# Patient Record
Sex: MALE | Race: WHITE | NOT HISPANIC OR LATINO | Employment: FULL TIME | URBAN - METROPOLITAN AREA
[De-identification: names, ages, dates, MRNs, and addresses within clinical notes are randomized per-mention and may not be internally consistent; named-entity substitution may affect disease eponyms.]

---

## 2019-04-05 DIAGNOSIS — M25.562 LEFT KNEE PAIN, UNSPECIFIED CHRONICITY: Primary | ICD-10-CM

## 2019-04-08 ENCOUNTER — OFFICE VISIT (OUTPATIENT)
Dept: ORTHOPEDICS | Facility: CLINIC | Age: 49
End: 2019-04-08

## 2019-04-08 ENCOUNTER — HOSPITAL ENCOUNTER (OUTPATIENT)
Dept: RADIOLOGY | Facility: HOSPITAL | Age: 49
Discharge: HOME | End: 2019-04-08
Attending: ORTHOPAEDIC SURGERY

## 2019-04-08 VITALS — HEART RATE: 87 BPM | DIASTOLIC BLOOD PRESSURE: 82 MMHG | SYSTOLIC BLOOD PRESSURE: 138 MMHG

## 2019-04-08 DIAGNOSIS — M71.22 BAKER CYST, LEFT: ICD-10-CM

## 2019-04-08 DIAGNOSIS — M70.52 PES ANSERINUS BURSITIS OF LEFT KNEE: ICD-10-CM

## 2019-04-08 DIAGNOSIS — S80.02XA CONTUSION OF LEFT KNEE, INITIAL ENCOUNTER: ICD-10-CM

## 2019-04-08 DIAGNOSIS — S83.242A ACUTE TEAR MEDIAL MENISCUS, LEFT, INITIAL ENCOUNTER: Primary | ICD-10-CM

## 2019-04-08 DIAGNOSIS — M25.562 LEFT KNEE PAIN, UNSPECIFIED CHRONICITY: ICD-10-CM

## 2019-04-08 PROCEDURE — 99204 OFFICE O/P NEW MOD 45 MIN: CPT | Mod: 25,S$PBB,, | Performed by: ORTHOPAEDIC SURGERY

## 2019-04-08 PROCEDURE — 20610 DRAIN/INJ JOINT/BURSA W/O US: CPT | Mod: PBBFAC | Performed by: ORTHOPAEDIC SURGERY

## 2019-04-08 PROCEDURE — 20610 LARGE JOINT ASPIRATION/INJECTION: L KNEE: ICD-10-PCS | Mod: S$PBB,59,51,LT | Performed by: ORTHOPAEDIC SURGERY

## 2019-04-08 PROCEDURE — 99999 PR PBB SHADOW E&M-EST. PATIENT-LVL II: CPT | Mod: PBBFAC,,, | Performed by: ORTHOPAEDIC SURGERY

## 2019-04-08 PROCEDURE — 73562 XR KNEE 3 VIEW LEFT: ICD-10-PCS | Mod: 26,LT,, | Performed by: RADIOLOGY

## 2019-04-08 PROCEDURE — 73562 X-RAY EXAM OF KNEE 3: CPT | Mod: TC,FY,LT

## 2019-04-08 PROCEDURE — 99212 OFFICE O/P EST SF 10 MIN: CPT | Mod: PBBFAC,25 | Performed by: ORTHOPAEDIC SURGERY

## 2019-04-08 PROCEDURE — 99204 PR OFFICE/OUTPT VISIT, NEW, LEVL IV, 45-59 MIN: ICD-10-PCS | Mod: 25,S$PBB,, | Performed by: ORTHOPAEDIC SURGERY

## 2019-04-08 PROCEDURE — 99999 PR PBB SHADOW E&M-EST. PATIENT-LVL II: ICD-10-PCS | Mod: PBBFAC,,, | Performed by: ORTHOPAEDIC SURGERY

## 2019-04-08 PROCEDURE — 73562 X-RAY EXAM OF KNEE 3: CPT | Mod: 26,LT,, | Performed by: RADIOLOGY

## 2019-04-08 RX ORDER — TRIAMCINOLONE ACETONIDE 40 MG/ML
80 INJECTION, SUSPENSION INTRA-ARTICULAR; INTRAMUSCULAR
Status: DISCONTINUED | OUTPATIENT
Start: 2019-04-08 | End: 2019-04-08 | Stop reason: HOSPADM

## 2019-04-08 RX ORDER — TRIAMCINOLONE ACETONIDE 40 MG/ML
40 INJECTION, SUSPENSION INTRA-ARTICULAR; INTRAMUSCULAR
Status: DISCONTINUED | OUTPATIENT
Start: 2019-04-08 | End: 2019-04-08 | Stop reason: HOSPADM

## 2019-04-08 RX ADMIN — TRIAMCINOLONE ACETONIDE 40 MG: 40 INJECTION, SUSPENSION INTRA-ARTICULAR; INTRAMUSCULAR at 01:04

## 2019-04-08 RX ADMIN — TRIAMCINOLONE ACETONIDE 80 MG: 40 INJECTION, SUSPENSION INTRA-ARTICULAR; INTRAMUSCULAR at 01:04

## 2019-04-08 NOTE — PROCEDURES
Large Joint Aspiration/Injection: L knee  Date/Time: 4/8/2019 1:04 PM  Performed by: Tiburcio Vogel DO  Authorized by: Tiburcio Vogel, DO     Consent Done?:  Yes (Verbal)  Indications:  Pain and diagnostic evaluation  Procedure site marked: Yes    Timeout: Prior to procedure the correct patient, procedure, and site was verified      Location:  Knee  Site:  L knee  Prep: Patient was prepped and draped in usual sterile fashion    Ultrasonic Guidance for needle placement: No  Needle size:  22 G  Approach:  Anteromedial  Medications:  40 mg triamcinolone acetonide 40 mg/mL  Patient tolerance:  Patient tolerated the procedure well with no immediate complications     Pes anserinus injection, left knee.

## 2019-04-08 NOTE — PROCEDURES
Large Joint Aspiration/Injection: L knee  Date/Time: 4/8/2019 1:03 PM  Performed by: Tiburcio Vogel DO  Authorized by: Tiburcio Vogel, DO     Consent Done?:  Yes (Verbal)  Indications:  Pain, joint swelling and diagnostic evaluation  Procedure site marked: Yes    Timeout: Prior to procedure the correct patient, procedure, and site was verified      Location:  Knee  Site:  L knee  Prep: Patient was prepped and draped in usual sterile fashion    Ultrasonic Guidance for needle placement: No  Needle size:  22 G  Approach:  Anterolateral  Medications:  80 mg triamcinolone acetonide 40 mg/mL  Patient tolerance:  Patient tolerated the procedure well with no immediate complications

## 2019-04-08 NOTE — PROGRESS NOTES
Subjective:      Patient ID: Ky Nelson Jr. is a 48 y.o. male.    Chief Complaint: Pain of the Left Knee    Referring Provider: Mik Self  No address on file    HPI:  Mr. Nelson is a 48-year-old male who presented today for evaluation of 2 weeks of left knee pain and swelling which began after the patient [place] 16 ft onto hard [place].  Flexing extended his knee increases symptoms while ice improves them. He stated he has swelling and giving way but denied locking.  He has taken NSAIDs with help.  He has worn a copper fit type brace which has helped.  He has physical therap nor had injections. y not done    Past Medical History:   Diagnosis Date    Hypertension  Seasonal allergies  GERD  Headaches      Past Surgical History:   Procedure Laterality Date    KNEE SURGERY arthroscopy right knee       *  *  * WRIST FRACTURE SURGERY ORIF right wrist  P IP IMPLANT AND REPAIR LEFT INDEX FINGER  VASECTOMY  TYMPANIC MEMBRANE REPAIR LEFT EAR         Review of patient's allergies indicates:   Allergen Reactions    Poison ivy extract Rash       Social History     Occupational History    Tree surgeon     Tobacco Use    Smoking status: Never Smoker    Smokeless tobacco: Former User   Substance and Sexual Activity    Alcohol use: Not Currently    Drug use: Never    Sexual activity: Not on file      Family History   Problem Relation Age of Onset    Hypertension Mother     Cancer Father        Previous Hospitalizations:  Denied previous hospitalizations.    ROS:   Review of Systems   Constitution: Negative for chills and fever.   HENT: Negative for congestion.    Eyes: Negative for blurred vision.   Cardiovascular: Negative for chest pain.   Respiratory: Negative for cough.    Endocrine: Negative for polydipsia.   Hematologic/Lymphatic: Negative for adenopathy.   Skin: Negative for itching.   Musculoskeletal: Negative for gout.   Gastrointestinal: Positive  for heartburn. Negative for constipation and diarrhea.   Genitourinary: Negative for nocturia.   Neurological: Positive for headaches. Negative for seizures.   Psychiatric/Behavioral: Negative for depression.   Allergic/Immunologic: Positive for environmental allergies.           Objective:      Physical Exam:   General: AAOx3.  No acute distress  HEENT: Normocephalic, PEARLA EOMI, Good Dentition  Neck: Supple, No JVD  Chest: Symetric, equal excursion on inspiration  Abdomen: Soft NTND  Vascular:  Pulses intact and equal bilaterally.  Capillary refill less than 3 seconds and equal bilaterally  Neurologic:  Pinprick and soft touch intact and equal bilaterally  Integment:  Ecchymosis medial proximal tibia left knee.  Extremity:  Knee:  Extension/flexion near equal bilaterally 0/128 degrees. Effusion left knee.  Baker cyst left knee.  No crepitus with motion either knee. Negative patellar load/compression both knees.  Negative patella apprehension/relocation both knees.  Varus/valgus stressing equal bilaterally with endpoint.  Lachman's/drawer equal bilaterally with endpoint.  Mild medial joint line tenderness left knee.  Domonique negative both knees.  [place] positive left knee. Tender at the anserine insertion left knee.  Swelling at the anserine insertion left knee.  Radiography:  Personally reviewed x-rays of the left knee completed on 04/05/2019 which showed no fracture dislocation.      Assessment:       Impression:      1. Acute tear medial meniscus, left, initial encounter    2. Rico cyst, left    3. Pes anserinus bursitis of left knee    4. Contusion of left knee, initial encounter          Plan:       1.  Discussed physical examination and radiographic findings with the patient. Ky understands that he has contused his knee causing a traumatic pes anserine bursitis.  He also appears to have a meniscus tear. Since he has not completed conservative management recommend he trial conservative care and then  if he fails conservative care consider surgical intervention.  2.  Offered a steroid injection to the left knee, he elected to proceed.  3.  Offered a steroid injection to the anserine insertion of the left knee, he elected to proceed.  4.  Use NSAIDs as tolerated allowed by PCM.  5.  Hinged meniscus brace, left knee.  One was fitted to the patient.  6.  Home exercises to include quadriceps and hamstring strengthening were shown discussed.  7.  Discussed possible referral to physical therapy declined for now.  8.  [facility] portal was discussed with the patient and information was given.  The patient was encouraged to use the portal for future encounters.  9.  Follow up p.r.n..

## 2019-11-13 ENCOUNTER — TELEPHONE (OUTPATIENT)
Dept: FAMILY MEDICINE | Facility: CLINIC | Age: 49
End: 2019-11-13
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
## 2019-11-14 ENCOUNTER — OFFICE VISIT (OUTPATIENT)
Dept: FAMILY MEDICINE | Facility: CLINIC | Age: 49
End: 2019-11-14

## 2019-11-14 ENCOUNTER — HOSPITAL ENCOUNTER (OUTPATIENT)
Dept: RADIOLOGY | Facility: HOSPITAL | Age: 49
Discharge: HOME | End: 2019-11-14
Attending: FAMILY MEDICINE

## 2019-11-14 VITALS
WEIGHT: 254.81 LBS | BODY MASS INDEX: 36.48 KG/M2 | OXYGEN SATURATION: 97 % | HEART RATE: 69 BPM | HEIGHT: 70 IN | DIASTOLIC BLOOD PRESSURE: 84 MMHG | SYSTOLIC BLOOD PRESSURE: 140 MMHG | TEMPERATURE: 98 F

## 2019-11-14 DIAGNOSIS — R09.81 NASAL CONGESTION: ICD-10-CM

## 2019-11-14 DIAGNOSIS — R68.89 SUSPECTED LEGIONELLA PNEUMONIA: ICD-10-CM

## 2019-11-14 DIAGNOSIS — R05.9 COUGH: ICD-10-CM

## 2019-11-14 DIAGNOSIS — R68.89 SUSPECTED LEGIONELLA PNEUMONIA: Primary | ICD-10-CM

## 2019-11-14 PROCEDURE — 71046 X-RAY EXAM CHEST 2 VIEWS: CPT | Mod: TC,FY

## 2019-11-14 PROCEDURE — 99214 OFFICE O/P EST MOD 30 MIN: CPT | Mod: S$GLB,,, | Performed by: FAMILY MEDICINE

## 2019-11-14 PROCEDURE — 71046 X-RAY EXAM CHEST 2 VIEWS: CPT | Mod: 26,,, | Performed by: RADIOLOGY

## 2019-11-14 PROCEDURE — 71046 XR CHEST PA AND LATERAL: ICD-10-PCS | Mod: 26,,, | Performed by: RADIOLOGY

## 2019-11-14 PROCEDURE — 99214 PR OFFICE/OUTPT VISIT, EST, LEVL IV, 30-39 MIN: ICD-10-PCS | Mod: S$GLB,,, | Performed by: FAMILY MEDICINE

## 2019-11-14 RX ORDER — METHYLPREDNISOLONE 4 MG/1
TABLET ORAL
Qty: 1 PACKAGE | Refills: 0 | Status: SHIPPED | OUTPATIENT
Start: 2019-11-14 | End: 2019-12-05

## 2019-11-14 RX ORDER — AMLODIPINE BESYLATE 5 MG/1
1 TABLET ORAL DAILY
COMMUNITY
End: 2022-10-27

## 2019-11-14 RX ORDER — GUAIFENESIN AND PSEUDOEPHEDRINE HCL 1200; 120 MG/1; MG/1
1 TABLET, EXTENDED RELEASE ORAL 2 TIMES DAILY PRN
Qty: 20 EACH | Refills: 0 | Status: SHIPPED | OUTPATIENT
Start: 2019-11-14 | End: 2019-11-24
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
## 2019-11-18 ENCOUNTER — TELEPHONE (OUTPATIENT)
Dept: FAMILY MEDICINE | Facility: CLINIC | Age: 49
End: 2019-11-18

## 2019-11-18 NOTE — TELEPHONE ENCOUNTER
Pt requesting results form blood work, provider has not release at this time, will call pt back.          ----- Message from Luz Odonnell sent at 11/18/2019  2:24 PM CST -----  Type:  Test Results    Who Called:  Patients wife - Imelda   Name of Test (Lab/Mammo/Etc):  Blood work testing for Suspected Legionella pneumonia   Date of Test:  11/14/19  Ordering Provider:  Camacho   Where the test was performed: [facility]   Best Call Back Number:   [phone]  Additional Information:  Please contact hans Segura directly to update and follow up

## 2019-11-21 DIAGNOSIS — Z12.11 COLON CANCER SCREENING: ICD-10-CM

## 2020-05-20 ENCOUNTER — PATIENT MESSAGE (OUTPATIENT)
Dept: ADMINISTRATIVE | Facility: HOSPITAL | Age: 50
End: 2020-05-20

## 2020-07-27 ENCOUNTER — HOSPITAL ENCOUNTER (EMERGENCY)
Facility: HOSPITAL | Age: 50
Discharge: HOME | End: 2020-07-27
Attending: EMERGENCY MEDICINE

## 2020-07-27 VITALS
HEART RATE: 84 BPM | TEMPERATURE: 98 F | OXYGEN SATURATION: 99 % | DIASTOLIC BLOOD PRESSURE: 89 MMHG | BODY MASS INDEX: 41.52 KG/M2 | WEIGHT: 290 LBS | RESPIRATION RATE: 20 BRPM | SYSTOLIC BLOOD PRESSURE: 125 MMHG | HEIGHT: 70 IN

## 2020-07-27 DIAGNOSIS — J18.9 COMMUNITY ACQUIRED PNEUMONIA OF RIGHT UPPER LOBE OF LUNG: Primary | ICD-10-CM

## 2020-07-27 DIAGNOSIS — R05.9 COUGH: ICD-10-CM

## 2020-07-27 DIAGNOSIS — J03.90 TONSILLITIS: ICD-10-CM

## 2020-07-27 LAB
ALBUMIN SERPL BCP-MCNC: 4.1 G/DL (ref 3.5–5.2)
ALP SERPL-CCNC: 59 U/L (ref 55–135)
ALT SERPL W/O P-5'-P-CCNC: 24 U/L (ref 10–44)
ANION GAP SERPL CALC-SCNC: 7 MMOL/L (ref 8–16)
AST SERPL-CCNC: 19 U/L (ref 10–40)
BASOPHILS # BLD AUTO: 0.01 K/UL (ref 0–0.2)
BASOPHILS NFR BLD: 0.1 % (ref 0–1.9)
BILIRUB SERPL-MCNC: 0.5 MG/DL (ref 0.1–1)
BUN SERPL-MCNC: 15 MG/DL (ref 6–20)
CALCIUM SERPL-MCNC: 9 MG/DL (ref 8.7–10.5)
CHLORIDE SERPL-SCNC: 107 MMOL/L (ref 95–110)
CO2 SERPL-SCNC: 23 MMOL/L (ref 23–29)
CREAT SERPL-MCNC: 1.1 MG/DL (ref 0.5–1.4)
DIFFERENTIAL METHOD: ABNORMAL
EOSINOPHIL # BLD AUTO: 0 K/UL (ref 0–0.5)
EOSINOPHIL NFR BLD: 0.3 % (ref 0–8)
ERYTHROCYTE [DISTWIDTH] IN BLOOD BY AUTOMATED COUNT: 13.1 % (ref 11.5–14.5)
EST. GFR  (AFRICAN AMERICAN): >60 ML/MIN/1.73 M^2
EST. GFR  (NON AFRICAN AMERICAN): >60 ML/MIN/1.73 M^2
GLUCOSE SERPL-MCNC: 116 MG/DL (ref 70–110)
HCT VFR BLD AUTO: 43.9 % (ref 40–54)
HGB BLD-MCNC: 14.5 G/DL (ref 14–18)
IMM GRANULOCYTES # BLD AUTO: 0.02 K/UL (ref 0–0.04)
IMM GRANULOCYTES NFR BLD AUTO: 0.3 % (ref 0–0.5)
LYMPHOCYTES # BLD AUTO: 2.6 K/UL (ref 1–4.8)
LYMPHOCYTES NFR BLD: 37.4 % (ref 18–48)
MCH RBC QN AUTO: 28.4 PG (ref 27–31)
MCHC RBC AUTO-ENTMCNC: 33 G/DL (ref 32–36)
MCV RBC AUTO: 86 FL (ref 82–98)
MONOCYTES # BLD AUTO: 0.3 K/UL (ref 0.3–1)
MONOCYTES NFR BLD: 3.6 % (ref 4–15)
NEUTROPHILS # BLD AUTO: 4 K/UL (ref 1.8–7.7)
NEUTROPHILS NFR BLD: 58.3 % (ref 38–73)
NRBC BLD-RTO: 0 /100 WBC
PLATELET # BLD AUTO: 319 K/UL (ref 150–350)
PMV BLD AUTO: 9.7 FL (ref 9.2–12.9)
POTASSIUM SERPL-SCNC: 3.7 MMOL/L (ref 3.5–5.1)
PROT SERPL-MCNC: 7.3 G/DL (ref 6–8.4)
RBC # BLD AUTO: 5.11 M/UL (ref 4.6–6.2)
SODIUM SERPL-SCNC: 137 MMOL/L (ref 136–145)
WBC # BLD AUTO: 6.85 K/UL (ref 3.9–12.7)

## 2020-07-27 PROCEDURE — 85025 COMPLETE CBC W/AUTO DIFF WBC: CPT

## 2020-07-27 PROCEDURE — 71046 X-RAY EXAM CHEST 2 VIEWS: CPT | Mod: TC,FY

## 2020-07-27 PROCEDURE — 96372 THER/PROPH/DIAG INJ SC/IM: CPT | Mod: 59

## 2020-07-27 PROCEDURE — 71046 XR CHEST PA AND LATERAL: ICD-10-PCS | Mod: 26,,, | Performed by: RADIOLOGY

## 2020-07-27 PROCEDURE — 71046 X-RAY EXAM CHEST 2 VIEWS: CPT | Mod: 26,,, | Performed by: RADIOLOGY

## 2020-07-27 PROCEDURE — 80053 COMPREHEN METABOLIC PANEL: CPT

## 2020-07-27 PROCEDURE — 99284 EMERGENCY DEPT VISIT MOD MDM: CPT | Mod: 25

## 2020-07-27 PROCEDURE — 63600175 PHARM REV CODE 636 W HCPCS: Performed by: NURSE PRACTITIONER

## 2020-07-27 RX ORDER — AZITHROMYCIN 500 MG/1
500 TABLET, FILM COATED ORAL DAILY
Qty: 5 TABLET | Refills: 0 | Status: SHIPPED | OUTPATIENT
Start: 2020-07-27 | End: 2020-08-01

## 2020-07-27 RX ORDER — DEXAMETHASONE SODIUM PHOSPHATE 10 MG/ML
10 INJECTION INTRAMUSCULAR; INTRAVENOUS
Status: COMPLETED | OUTPATIENT
Start: 2020-07-27 | End: 2020-07-27

## 2020-07-27 RX ADMIN — DEXAMETHASONE SODIUM PHOSPHATE 10 MG: 10 INJECTION, SOLUTION INTRAMUSCULAR; INTRAVENOUS at 08:07

## 2020-07-28 NOTE — DISCHARGE INSTRUCTIONS
Complete all antibiotics as prescribed.  Take OTC Motrin/Tylenol as needed for pain/fever.  Take OTC probiotic daily or eat 1 small cup of yogurt daily while on antibiotics.  Drink fluids, eat diet as tolerated & rest.    Take all medications as prescribed.  Download the [facility] brianne to access your health records & test results.  Please remember that you had a visit to the emergency room today and this does not substitute as primary care services for ongoing management because emergency services is a snap shot in time.  Should you have any worsening condition that requires emergency services do not hesitate to return to the ER.

## 2020-07-28 NOTE — ED PROVIDER NOTES
Encounter Date: 7/27/2020       History     Chief Complaint   Patient presents with    Cough     Patient complaining of productive cough x6 days.     50-year-old male presents to ER for concerns of sore throat, green productive cough & right-sided chest tightness for the past 6-7 days; chest tightness exacerbates with deep breathing and coughing, symptoms started slowly and has been gradually worsening since onset described as moderate currently, no prescription or OTC medications have been taken    Denies fever, headache, dizziness, syncope, vision changes, neck pain, difficult swallowing, trouble controlling secretions, change in voice, chest pain, shortness breath, abdominal pain, nausea/vomiting    No previous evaluation has been performed nor has PCP been contacted for today's concerns    Past medical/surgical history, allergies & current medications reviewed with patient    Known SARS-CoV2 exposure:  No  Smokes:  No      The history is provided by the patient. No  was used.     Review of patient's allergies indicates:   Allergen Reactions    Poison ivy extract Rash     Past Medical History:   Diagnosis Date    Hypertension      Past Surgical History:   Procedure Laterality Date    KNEE SURGERY      WRIST FRACTURE SURGERY       Family History   Problem Relation Age of Onset    Hypertension Mother     Cancer Father      Social History     Tobacco Use    Smoking status: Never Smoker    Smokeless tobacco: Former User   Substance Use Topics    Alcohol use: Not Currently    Drug use: Never     Review of Systems   Constitutional: Negative for fever.   HENT: Positive for sore throat. Negative for congestion, ear pain, facial swelling, trouble swallowing and voice change.    Respiratory: Positive for cough and chest tightness. Negative for shortness of breath.    Cardiovascular: Negative for chest pain.   Gastrointestinal: Negative for abdominal pain, nausea and vomiting.   Genitourinary:  Negative for dysuria.   Musculoskeletal: Negative for back pain.   Skin: Negative for rash.   Neurological: Negative for weakness.   Hematological: Does not bruise/bleed easily.   All other systems reviewed and are negative.      Physical Exam     Initial Vitals [07/27/20 1916]   BP Pulse Resp Temp SpO2   125/89 84 20 97.5 °F (36.4 °C) 99 %      MAP       --         Physical Exam    Nursing note and vitals reviewed.  Constitutional: He appears well-developed. He does not appear ill. No distress.   Afebrile, vital signs stable   HENT:   Head: Normocephalic.   Right Ear: External ear normal.   Left Ear: External ear normal.   Nose: Nose normal.   Mouth/Throat: Uvula is midline, oropharynx is clear and moist and mucous membranes are normal.   Tonsils 2+ bilaterally w/out exudate   Eyes: Lids are normal.   Neck: Neck supple.   Cardiovascular: Normal rate.   Pulmonary/Chest: Effort normal and breath sounds normal. No respiratory distress.   Abdominal: He exhibits no distension.   Lymphadenopathy:     He has no cervical adenopathy.   Neurological: He is alert.   Skin: No rash noted.   Psychiatric: He has a normal mood and affect.         ED Course   Procedures  Labs Reviewed   CBC W/ AUTO DIFFERENTIAL - Abnormal; Notable for the following components:       Result Value    Mono% 3.6 (*)     All other components within normal limits   COMPREHENSIVE METABOLIC PANEL - Abnormal; Notable for the following components:    Glucose 116 (*)     Anion Gap 7 (*)     All other components within normal limits          Imaging Results          X-Ray Chest PA And Lateral (Final result)  Result time 07/28/20 08:33:24    Final result by Samir Alberto MD (07/28/20 08:33:24)                 Impression:      No acute chest disease.      Electronically signed by: Samir Alberto  Date:    07/28/2020  Time:    08:33             Narrative:    EXAMINATION:  XR CHEST PA AND LATERAL    CLINICAL HISTORY:  Cough    TECHNIQUE:  PA and lateral views  of the chest were performed.    COMPARISON:  11/14/2019.    FINDINGS:  Mild pulmonary hypoinflation.The lungs are clear, with normal appearance of pulmonary vasculature and no pleural effusion or pneumothorax.    The cardiac silhouette is normal in size. The hilar and mediastinal contours are unremarkable.    Bones are intact.                                 Medical Decision Making:   ED Management:  Chest x-ray image reviewed:  Questionable opacity to RUL; over-read pending Radiology    Medications given:  Decadron    Findings, diagnosis & plan of care discussed with patient:  CAP, cough, tonsillitis; will discharge patient home with azithromycin and Rynex DM, care instructions given -- further instructions given to follow-up with PCP in 3-5 days if symptoms have not begun to improve    All questions answered, strict return precautions given, patient verbalized understanding to all instructions, pleasant visit -- vital signs are stable & patient is in no distress at discharge    Disclaimer:  This note was prepared with [employer] Naturally Speaking voice recognition transcription software. Garbled syntax, mangled pronouns, and other bizarre constructions may be attributed to that software system.    7/29/20 @ 9782:  We called patient's mobile phone ([phone]) to follow-up with him to see how he is doing since his visit 2 days ago, he did not answer and call went directly to voicemail with a voicemail message being left, patient was encouraged to contact us should he have any further concerns or worsening of condition                                   Clinical Impression:       ICD-10-CM ICD-9-CM   1. Community acquired pneumonia of right upper lobe of lung  J18.1 481   2. Cough  R05 786.2   3. Tonsillitis  J03.90 463             ED Disposition Condition    Discharge Stable        ED Prescriptions     Medication Sig Dispense Start Date End Date Auth. Provider    azithromycin (ZITHROMAX) 500 MG tablet Take 1 tablet  (500 mg total) by mouth once daily. for 5 days 5 tablet 7/27/2020 8/1/2020 Robbie Hilton NP    brompheniramin-phenylephrin-DM 1-2.5-5 mg/5 mL Soln Take 5 mLs by mouth every 4 (four) hours as needed (cough). 120 mL 7/27/2020 8/6/2020 Robbie Hilton NP        Follow-up Information     Follow up With Specialties Details Why Contact Info    Shereen Sauer MD Family Medicine Go to  In 3-5 days if symptoms have not begun to improve [address] [facility] [place] [address]  [phone]                                       Robbie Hilton NP  07/27/20 1954       Robbie Hilton NP  07/27/20 2000       Robbie Hilton NP  07/28/20 1010       Robbie Hilton NP  07/29/20 7158

## 2020-08-20 DIAGNOSIS — N63.0 LUMP OR MASS IN BREAST: Primary | ICD-10-CM

## 2020-08-26 ENCOUNTER — HOSPITAL ENCOUNTER (OUTPATIENT)
Dept: RADIOLOGY | Facility: HOSPITAL | Age: 50
Discharge: HOME | End: 2020-08-26
Attending: NURSE PRACTITIONER

## 2020-08-26 VITALS — WEIGHT: 250 LBS | HEIGHT: 70 IN | BODY MASS INDEX: 35.79 KG/M2

## 2020-08-26 DIAGNOSIS — N63.0 LUMP OR MASS IN BREAST: ICD-10-CM

## 2020-08-26 DIAGNOSIS — N63.0 BREAST MASS IN MALE: ICD-10-CM

## 2020-08-26 PROCEDURE — 77066 MAMMO DIGITAL DIAGNOSTIC BILAT WITH TOMOSYNTHESIS_CAD: ICD-10-PCS | Mod: 26,,, | Performed by: RADIOLOGY

## 2020-08-26 PROCEDURE — 77062 BREAST TOMOSYNTHESIS BI: CPT | Mod: TC

## 2020-08-26 PROCEDURE — 76642 US BREAST RIGHT LIMITED: ICD-10-PCS | Mod: 26,RT,, | Performed by: RADIOLOGY

## 2020-08-26 PROCEDURE — 77062 MAMMO DIGITAL DIAGNOSTIC BILAT WITH TOMOSYNTHESIS_CAD: ICD-10-PCS | Mod: 26,,, | Performed by: RADIOLOGY

## 2020-08-26 PROCEDURE — 77062 BREAST TOMOSYNTHESIS BI: CPT | Mod: 26,,, | Performed by: RADIOLOGY

## 2020-08-26 PROCEDURE — 76642 ULTRASOUND BREAST LIMITED: CPT | Mod: TC,RT

## 2020-08-26 PROCEDURE — 77066 DX MAMMO INCL CAD BI: CPT | Mod: 26,,, | Performed by: RADIOLOGY

## 2020-08-26 PROCEDURE — 76642 ULTRASOUND BREAST LIMITED: CPT | Mod: 26,RT,, | Performed by: RADIOLOGY

## 2022-10-27 ENCOUNTER — HOSPITAL ENCOUNTER (OUTPATIENT)
Facility: HOSPITAL | Age: 52
Discharge: HOME | End: 2022-10-28
Attending: STUDENT IN AN ORGANIZED HEALTH CARE EDUCATION/TRAINING PROGRAM | Admitting: STUDENT IN AN ORGANIZED HEALTH CARE EDUCATION/TRAINING PROGRAM

## 2022-10-27 DIAGNOSIS — L03.113 CELLULITIS OF MULTIPLE SITES OF RIGHT HAND AND FINGERS: Primary | ICD-10-CM

## 2022-10-27 DIAGNOSIS — L03.011 CELLULITIS OF MULTIPLE SITES OF RIGHT HAND AND FINGERS: Primary | ICD-10-CM

## 2022-10-27 DIAGNOSIS — L03.011 CELLULITIS OF FINGER OF RIGHT HAND: ICD-10-CM

## 2022-10-27 DIAGNOSIS — R07.9 CHEST PAIN: ICD-10-CM

## 2022-10-27 PROBLEM — L30.8 PUSTULAR DERMATITIS: Status: ACTIVE | Noted: 2022-10-27

## 2022-10-27 LAB
ALBUMIN SERPL BCP-MCNC: 3.8 G/DL (ref 3.5–5.2)
ALP SERPL-CCNC: 74 U/L (ref 55–135)
ALT SERPL W/O P-5'-P-CCNC: 21 U/L (ref 10–44)
ANION GAP SERPL CALC-SCNC: 9 MMOL/L (ref 8–16)
AST SERPL-CCNC: 16 U/L (ref 10–40)
BASOPHILS # BLD AUTO: 0 K/UL (ref 0–0.2)
BASOPHILS NFR BLD: 0 % (ref 0–1.9)
BILIRUB SERPL-MCNC: 0.5 MG/DL (ref 0.1–1)
BUN SERPL-MCNC: 12 MG/DL (ref 6–20)
CALCIUM SERPL-MCNC: 9 MG/DL (ref 8.7–10.5)
CHLORIDE SERPL-SCNC: 106 MMOL/L (ref 95–110)
CO2 SERPL-SCNC: 25 MMOL/L (ref 23–29)
CREAT SERPL-MCNC: 1 MG/DL (ref 0.5–1.4)
CRP SERPL-MCNC: 37.1 MG/L (ref 0–8.2)
DIFFERENTIAL METHOD: ABNORMAL
EOSINOPHIL # BLD AUTO: 0 K/UL (ref 0–0.5)
EOSINOPHIL NFR BLD: 0.1 % (ref 0–8)
ERYTHROCYTE [DISTWIDTH] IN BLOOD BY AUTOMATED COUNT: 12.1 % (ref 11.5–14.5)
EST. GFR  (NO RACE VARIABLE): >60 ML/MIN/1.73 M^2
GLUCOSE SERPL-MCNC: 86 MG/DL (ref 70–110)
HCT VFR BLD AUTO: 45.7 % (ref 40–54)
HGB BLD-MCNC: 15.4 G/DL (ref 14–18)
IMM GRANULOCYTES # BLD AUTO: 0.04 K/UL (ref 0–0.04)
IMM GRANULOCYTES NFR BLD AUTO: 0.4 % (ref 0–0.5)
LACTATE SERPL-SCNC: 1.2 MMOL/L (ref 0.5–2.2)
LYMPHOCYTES # BLD AUTO: 1.9 K/UL (ref 1–4.8)
LYMPHOCYTES NFR BLD: 18.5 % (ref 18–48)
MCH RBC QN AUTO: 29.1 PG (ref 27–31)
MCHC RBC AUTO-ENTMCNC: 33.7 G/DL (ref 32–36)
MCV RBC AUTO: 86 FL (ref 82–98)
MONOCYTES # BLD AUTO: 0.6 K/UL (ref 0.3–1)
MONOCYTES NFR BLD: 6.2 % (ref 4–15)
NEUTROPHILS # BLD AUTO: 7.5 K/UL (ref 1.8–7.7)
NEUTROPHILS NFR BLD: 74.8 % (ref 38–73)
NRBC BLD-RTO: 0 /100 WBC
PLATELET # BLD AUTO: 276 K/UL (ref 150–450)
PMV BLD AUTO: 9.4 FL (ref 9.2–12.9)
POTASSIUM SERPL-SCNC: 4.1 MMOL/L (ref 3.5–5.1)
PROT SERPL-MCNC: 7.9 G/DL (ref 6–8.4)
RBC # BLD AUTO: 5.29 M/UL (ref 4.6–6.2)
SARS-COV-2 RDRP RESP QL NAA+PROBE: NEGATIVE
SODIUM SERPL-SCNC: 140 MMOL/L (ref 136–145)
WBC # BLD AUTO: 10.02 K/UL (ref 3.9–12.7)

## 2022-10-27 PROCEDURE — 99220 PR INITIAL OBSERVATION CARE,LEVL III: ICD-10-PCS | Mod: ,,, | Performed by: STUDENT IN AN ORGANIZED HEALTH CARE EDUCATION/TRAINING PROGRAM

## 2022-10-27 PROCEDURE — 25000003 PHARM REV CODE 250: Performed by: HOSPITALIST

## 2022-10-27 PROCEDURE — 71045 X-RAY EXAM CHEST 1 VIEW: CPT | Mod: TC

## 2022-10-27 PROCEDURE — 86592 SYPHILIS TEST NON-TREP QUAL: CPT | Performed by: NURSE PRACTITIONER

## 2022-10-27 PROCEDURE — G0378 HOSPITAL OBSERVATION PER HR: HCPCS

## 2022-10-27 PROCEDURE — 63600175 PHARM REV CODE 636 W HCPCS: Performed by: STUDENT IN AN ORGANIZED HEALTH CARE EDUCATION/TRAINING PROGRAM

## 2022-10-27 PROCEDURE — 25500020 PHARM REV CODE 255: Performed by: STUDENT IN AN ORGANIZED HEALTH CARE EDUCATION/TRAINING PROGRAM

## 2022-10-27 PROCEDURE — 83605 ASSAY OF LACTIC ACID: CPT | Performed by: NURSE PRACTITIONER

## 2022-10-27 PROCEDURE — 86140 C-REACTIVE PROTEIN: CPT | Performed by: NURSE PRACTITIONER

## 2022-10-27 PROCEDURE — 73201 CT HAND WITH CONTRAST RIGHT: ICD-10-PCS | Mod: 26,RT,, | Performed by: RADIOLOGY

## 2022-10-27 PROCEDURE — U0002 COVID-19 LAB TEST NON-CDC: HCPCS | Performed by: STUDENT IN AN ORGANIZED HEALTH CARE EDUCATION/TRAINING PROGRAM

## 2022-10-27 PROCEDURE — 71045 XR CHEST 1 VIEW: ICD-10-PCS | Mod: 26,,, | Performed by: RADIOLOGY

## 2022-10-27 PROCEDURE — 63600175 PHARM REV CODE 636 W HCPCS: Performed by: NURSE PRACTITIONER

## 2022-10-27 PROCEDURE — 87147 CULTURE TYPE IMMUNOLOGIC: CPT | Performed by: NURSE PRACTITIONER

## 2022-10-27 PROCEDURE — 73201 CT UPPER EXTREMITY W/DYE: CPT | Mod: TC,RT

## 2022-10-27 PROCEDURE — 25000003 PHARM REV CODE 250: Performed by: STUDENT IN AN ORGANIZED HEALTH CARE EDUCATION/TRAINING PROGRAM

## 2022-10-27 PROCEDURE — 87186 SC STD MICRODIL/AGAR DIL: CPT | Performed by: NURSE PRACTITIONER

## 2022-10-27 PROCEDURE — 73120 X-RAY EXAM OF HAND: CPT | Mod: TC,RT

## 2022-10-27 PROCEDURE — 99285 EMERGENCY DEPT VISIT HI MDM: CPT | Mod: 25

## 2022-10-27 PROCEDURE — 71045 X-RAY EXAM CHEST 1 VIEW: CPT | Mod: 26,,, | Performed by: RADIOLOGY

## 2022-10-27 PROCEDURE — 25000003 PHARM REV CODE 250: Performed by: NURSE PRACTITIONER

## 2022-10-27 PROCEDURE — 87070 CULTURE OTHR SPECIMN AEROBIC: CPT | Performed by: NURSE PRACTITIONER

## 2022-10-27 PROCEDURE — 73120 XR HAND 2 VIEW RIGHT: ICD-10-PCS | Mod: 26,RT,, | Performed by: RADIOLOGY

## 2022-10-27 PROCEDURE — 96365 THER/PROPH/DIAG IV INF INIT: CPT | Mod: 59

## 2022-10-27 PROCEDURE — 87077 CULTURE AEROBIC IDENTIFY: CPT | Performed by: NURSE PRACTITIONER

## 2022-10-27 PROCEDURE — 73201 CT UPPER EXTREMITY W/DYE: CPT | Mod: 26,RT,, | Performed by: RADIOLOGY

## 2022-10-27 PROCEDURE — 85025 COMPLETE CBC W/AUTO DIFF WBC: CPT | Performed by: NURSE PRACTITIONER

## 2022-10-27 PROCEDURE — 73120 X-RAY EXAM OF HAND: CPT | Mod: 26,RT,, | Performed by: RADIOLOGY

## 2022-10-27 PROCEDURE — 87040 BLOOD CULTURE FOR BACTERIA: CPT | Mod: 59 | Performed by: NURSE PRACTITIONER

## 2022-10-27 PROCEDURE — 96366 THER/PROPH/DIAG IV INF ADDON: CPT

## 2022-10-27 PROCEDURE — 99220 PR INITIAL OBSERVATION CARE,LEVL III: CPT | Mod: ,,, | Performed by: STUDENT IN AN ORGANIZED HEALTH CARE EDUCATION/TRAINING PROGRAM

## 2022-10-27 PROCEDURE — 80053 COMPREHEN METABOLIC PANEL: CPT | Performed by: NURSE PRACTITIONER

## 2022-10-27 RX ORDER — GLUCAGON 1 MG
1 KIT INJECTION
Status: DISCONTINUED | OUTPATIENT
Start: 2022-10-27 | End: 2022-10-28 | Stop reason: HOSPADM

## 2022-10-27 RX ORDER — IBUPROFEN 200 MG
16 TABLET ORAL
Status: DISCONTINUED | OUTPATIENT
Start: 2022-10-27 | End: 2022-10-28 | Stop reason: HOSPADM

## 2022-10-27 RX ORDER — SODIUM CHLORIDE 0.9 % (FLUSH) 0.9 %
10 SYRINGE (ML) INJECTION EVERY 12 HOURS PRN
Status: DISCONTINUED | OUTPATIENT
Start: 2022-10-27 | End: 2022-10-28 | Stop reason: HOSPADM

## 2022-10-27 RX ORDER — NALOXONE HCL 0.4 MG/ML
0.02 VIAL (ML) INJECTION
Status: DISCONTINUED | OUTPATIENT
Start: 2022-10-27 | End: 2022-10-28 | Stop reason: HOSPADM

## 2022-10-27 RX ORDER — ACETAMINOPHEN 325 MG/1
650 TABLET ORAL EVERY 6 HOURS PRN
Status: DISCONTINUED | OUTPATIENT
Start: 2022-10-27 | End: 2022-10-28 | Stop reason: HOSPADM

## 2022-10-27 RX ORDER — IBUPROFEN 200 MG
24 TABLET ORAL
Status: DISCONTINUED | OUTPATIENT
Start: 2022-10-27 | End: 2022-10-28 | Stop reason: HOSPADM

## 2022-10-27 RX ADMIN — IOHEXOL 100 ML: 350 INJECTION, SOLUTION INTRAVENOUS at 05:10

## 2022-10-27 RX ADMIN — PIPERACILLIN AND TAZOBACTAM 4.5 G: 4; .5 INJECTION, POWDER, LYOPHILIZED, FOR SOLUTION INTRAVENOUS; PARENTERAL at 09:10

## 2022-10-27 RX ADMIN — VANCOMYCIN HYDROCHLORIDE 1500 MG: 1.5 INJECTION, POWDER, LYOPHILIZED, FOR SOLUTION INTRAVENOUS at 02:10

## 2022-10-27 RX ADMIN — ACETAMINOPHEN 650 MG: 325 TABLET ORAL at 09:10

## 2022-10-27 RX ADMIN — PIPERACILLIN AND TAZOBACTAM 3.38 G: 3; .375 INJECTION, POWDER, LYOPHILIZED, FOR SOLUTION INTRAVENOUS; PARENTERAL at 02:10

## 2022-10-27 NOTE — PROGRESS NOTES
Pharmacokinetic Initial Assessment: IV Vancomycin    Assessment/Plan:    Vancomycin was initiated intravenously in ED with 1500 mg; dose to be continued every 12 hours.  Desired empiric serum trough concentration is 10 to 15 mcg/mL  Draw vancomycin trough level 60 min prior to fourth dose on 10/29 at approximately 01:00.  Pharmacy will continue to follow and monitor vancomycin.      Please contact pharmacy at extension [phone] with any questions regarding this assessment.     Thank you for the consult,   Miriam WellsD       Patient brief summary:  Ky Nelson Jr. is a 52 y.o. male initiated on antimicrobial therapy with IV Vancomycin for treatment of suspected skin & soft tissue infection    Drug Allergies:   Review of patient's allergies indicates:   Allergen Reactions    Poison ivy extract Rash       Actual Body Weight:   111.1 kg    Renal Function:   Estimated Creatinine Clearance: 106.6 mL/min (based on SCr of 1 mg/dL).,     Dialysis Method (if applicable):  N/A    CBC (last 72 hours):  Recent Labs   Lab Result Units 10/27/22  1358   WBC K/uL 10.02   Hemoglobin g/dL 15.4   Hematocrit % 45.7   Platelets K/uL 276   Gran % % 74.8*   Lymph % % 18.5   Mono % % 6.2   Eosinophil % % 0.1   Basophil % % 0.0   Differential Method  Automated       Metabolic Panel (last 72 hours):  Recent Labs   Lab Result Units 10/27/22  1301   Sodium mmol/L 140   Potassium mmol/L 4.1   Chloride mmol/L 106   CO2 mmol/L 25   Glucose mg/dL 86   BUN mg/dL 12   Creatinine mg/dL 1.0   Albumin g/dL 3.8   Total Bilirubin mg/dL 0.5   Alkaline Phosphatase U/L 74   AST U/L 16   ALT U/L 21       Drug levels (last 3 results):  No results for input(s): VANCOMYCINRA, VANCORANDOM, VANCOMYCINPE, VANCOPEAK, VANCOMYCINTR, VANCOTROUGH in the last 72 hours.    Microbiologic Results:  Microbiology Results (last 7 days)       Procedure Component Value Units Date/Time    Blood Culture #1 **CANNOT BE ORDERED STAT** [[id number]] Collected: 10/27/22 6841     Order Status: Sent Specimen: Blood Updated: 10/27/22 1801    Blood Culture #1 **CANNOT BE ORDERED STAT** [[id number]] Collected: 10/27/22 1325    Order Status: Sent Specimen: Blood Updated: 10/27/22 1801    Aerobic culture (Specify Source) **CANNOT BE ORDERED AS STAT** [[id number]] Collected: 10/27/22 1402    Order Status: Sent Specimen: Abscess from Hand, Right Updated: 10/27/22 1430

## 2022-10-27 NOTE — ASSESSMENT & PLAN NOTE
IV abx  Xray with no gas  CT hand w/ contrast ordered  Has worsened since admit- If no improvement or further worsening will need ortho consult and derm consult  NPO for now until scan completed

## 2022-10-27 NOTE — ED PROVIDER NOTES
Encounter Date: 10/27/2022       History     Chief Complaint   Patient presents with    Blister     Blisters to bilateral palm of hands started 3-4 days ago. 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      Patient is a 52-year-old male presents emergency room with small abscesses noted to the right hand, now on left hand, 1 to right lower leg, and right upper thigh.  Patient states he started with what he thought was a splinter in his right middle finger.  Says he was able to drain the abscess, but never seen as plantar anything come out.  Infection is now spread into the palm of right hand and starting to have small abscesses to the left hand.  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  Patient now has cellulitis to the right middle finger and into the distal part of right hand.  Patient is lying low-grade temperature.  States he is having some chills yesterday.  He denies any nausea, vomiting, diarrhea, chest pain, shortness of breath.    Review of patient's allergies indicates:   Allergen Reactions    Poison ivy extract Rash     Past Medical History:   Diagnosis Date    Hypertension      Past Surgical History:   Procedure Laterality Date    KNEE SURGERY      WRIST FRACTURE SURGERY       Family History   Problem Relation Age of Onset    Hypertension Mother     Cancer Father     Breast cancer Neg Hx      Social History     Tobacco Use    Smoking status: Never    Smokeless tobacco: Former   Substance Use Topics    Alcohol use: Not Currently    Drug use: Never     Review of Systems   Constitutional: Negative.    HENT: Negative.     Eyes: Negative.    Respiratory: Negative.     Cardiovascular: Negative.    Gastrointestinal: Negative.    Endocrine: Negative.    Genitourinary: Negative.    Musculoskeletal: Negative.    Skin:  Positive for rash (Multiple pruritic small appearing abscesses noted on several fingers of the right hand,  cellulitis to the right finger) and wound.   Allergic/Immunologic: Negative for food allergies.   Neurological: Negative.    Hematological: Negative.    Psychiatric/Behavioral: Negative.     All other systems reviewed and are negative.    Physical Exam     Initial Vitals [10/27/22 1106]   BP Pulse Resp Temp SpO2   (!) 144/103 83 16 99.3 °F (37.4 °C) 97 %      MAP       --         Physical Exam    Nursing note and vitals reviewed.  Constitutional: He appears well-developed and well-nourished. He is not diaphoretic. No distress.   HENT:   Head: Normocephalic and atraumatic.   Mouth/Throat: Oropharynx is clear and moist.   Eyes: Conjunctivae are normal. Pupils are equal, round, and reactive to light. Right eye exhibits no discharge. Left eye exhibits no discharge. No scleral icterus.   Neck: No thyromegaly present. No tracheal deviation present.   Normal range of motion.  Cardiovascular:  Normal rate, regular rhythm, normal heart sounds and intact distal pulses.           No murmur heard.  Pulmonary/Chest: Breath sounds normal. No respiratory distress. He has no wheezes. He has no rhonchi. He has no rales.   Abdominal: Abdomen is soft.   Musculoskeletal:         General: No tenderness or edema. Normal range of motion.      Cervical back: Normal range of motion.     Lymphadenopathy:     He has no cervical adenopathy.   Neurological: He is alert and oriented to person, place, and time. He has normal strength. He displays normal reflexes. GCS score is 15. GCS eye subscore is 4. GCS verbal subscore is 5. GCS motor subscore is 6.   Skin: Skin is warm and dry. Capillary refill takes 2 to 3 seconds. Rash and abscess (Healing abscess noted to the right lateral lower leg, several small questionable abscesses to the right lateral thigh, several small prulent-like abscesses to the left hand.) noted.   Cellulitis noted to the right middle finger that is now in the distal end of right hand along with all the lower small prolonged  abscesses.  Eczema is noted on both ankles.  Questionable small abscesses starting and between toes on the right foot.   Psychiatric: He has a normal mood and affect.       ED Course   Procedures  Labs Reviewed   CBC W/ AUTO DIFFERENTIAL - Abnormal; Notable for the following components:       Result Value    Gran % 74.8 (*)     All other components within normal limits    Narrative:     Release to patient->Immediate  Recoll. [id number] by ALYSA at 10/27/2022 13:22, reason: Specimen   clotted   C-REACTIVE PROTEIN - Abnormal; Notable for the following components:    CRP 37.1 (*)     All other components within normal limits    Narrative:     Release to patient->Immediate   CULTURE, BLOOD   CULTURE, BLOOD   CULTURE, AEROBIC  (SPECIFY SOURCE)   LACTIC ACID, PLASMA    Narrative:     Release to patient->Immediate   COMPREHENSIVE METABOLIC PANEL    Narrative:     Release to patient->Immediate   RPR   SARS-COV-2 RNA AMPLIFICATION, QUAL          Imaging Results    None          Medications   piperacillin-tazobactam 3.375 g in dextrose 5 % 50 mL IVPB (ready to mix system) (3.375 g Intravenous New Bag 10/27/22 1407)   vancomycin (VANCOCIN) 1,500 mg in dextrose 5 % 250 mL IVPB (1,500 mg Intravenous New Bag 10/27/22 1410)   sodium chloride 0.9% flush 10 mL (has no administration in time range)   naloxone 0.4 mg/mL injection 0.02 mg (has no administration in time range)   glucose chewable tablet 16 g (has no administration in time range)   glucose chewable tablet 24 g (has no administration in time range)   dextrose 50% injection 12.5 g (has no administration in time range)   dextrose 50% injection 25 g (has no administration in time range)   glucagon (human recombinant) injection 1 mg (has no administration in time range)     Medical Decision Making:   Initial Assessment:   Patient seen examined emergency room.  There is some pictures on the chart for review patient's hands.  Assessment as noted above.  Differential Diagnosis:    Cellulitis, abscesses, syphilis, monkey pox  Clinical Tests:   Lab Tests: Ordered and Reviewed  The following lab test(s) were unremarkable: CBC, CMP and Lactate       <> Summary of Lab: Wound culture and blood cultures pending.  ED Management:  Patient seen examined emergency room.  Culture collected from right hand.  Blood cultures also collected.  Labs reviewed.  Patient started on Zosyn and vancomycin.  Hospital Medicine was consulted.  Patient to be admitted observation status for cellulitis to the right middle finger and right hand.                        Clinical Impression:   Final diagnoses:  [L03.011, L03.113] Cellulitis of multiple sites of right hand and fingers (Primary)  [L03.011] Cellulitis of finger of right hand      ED Disposition Condition    Observation                 Lucas Soto NP  10/27/22 0131

## 2022-10-27 NOTE — HPI
52 w/ no pmh presenting w/ rash on right hand. Patient states that he had a small pustule on right middle finger that he lanced at home and then it spread down his finger to his hand with multiple pustular lesions. 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 Patient states that he did have his hands down in ocean water on Saturday while working on a boat. Noticed lesions on Tuesday. Also noticed a lesion on left hand today. Right lateral calf  with a lesion as well that came up two days ago and is healing. Patient also with lesion on hip that is red scaly and had some blisters form that has improved but started 2-3 weeks ago. Patient started noticing swelling of right middle finger and numbness today so came in to ED. No fever, chills, NVD. No sick contacts. Patient in monogamous sexual relationship with his wife for 33 years. Endorses mild cough that has been present for past 2-3 weeks but has had no sputum production or SOB.        Has a history of eczema that is flaring up and has lesions on bilateral feet that are not atypical for his typical flare ups.

## 2022-10-27 NOTE — H&P
[facility]  History & Physical    Patient Name: Ky Nelson Jr.  MRN: [id number]  Patient Class: OP- Observation  Admission Date: 10/27/2022  Attending Physician: Dayron See MD   Primary Care Provider: Nia Wilks NP         Patient information was obtained from patient and ER records.     Subjective:     Principal Problem:Pustular dermatitis    Chief Complaint:   Chief Complaint   Patient presents with    Blister     Blisters to bilateral palm of hands started 3-4 days ago. 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         HPI: 52 w/ no pmh presenting w/ rash on right hand. Patient states that he had a small pustule on right middle finger that he lanced at home and then it spread down his finger to his hand with multiple pustular lesions. 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 Patient states that he did have his hands down in ocean water on Saturday while working on a boat. Noticed lesions on Tuesday. Also noticed a lesion on left hand today. Right lateral calf  with a lesion as well that came up two days ago and is healing. Patient also with lesion on hip that is red scaly and had some blisters form that has improved but started 2-3 weeks ago. Patient started noticing swelling of right middle finger and numbness today so came in to ED. No fever, chills, NVD. No sick contacts. Patient in monogamous sexual relationship with his wife for 33 years. Endorses mild cough that has been present for past 2-3 weeks but has had no sputum production or SOB.        Has a history of eczema that is flaring up and has lesions on bilateral feet that are not atypical for his typical flare ups.             Past Medical History:   Diagnosis Date    Hypertension        Past Surgical History:   Procedure Laterality Date    KNEE SURGERY      WRIST FRACTURE SURGERY         Review of patient's allergies indicates:   Allergen Reactions    Poison ivy extract Rash        No current facility-administered medications on file prior to encounter.     Current Outpatient Medications on File Prior to Encounter   Medication Sig    [DISCONTINUED] amLODIPine (NORVASC) 5 MG tablet Take 1 tablet by mouth once daily.     Family History       Problem Relation (Age of Onset)    Cancer Father    Hypertension Mother          Tobacco Use    Smoking status: Never    Smokeless tobacco: Former   Substance and Sexual Activity    Alcohol use: Not Currently    Drug use: Never    Sexual activity: Yes     Review of Systems  Objective:     Vital Signs (Most Recent):  Temp: 97.1 °F (36.2 °C) (10/27/22 1522)  Pulse: 82 (10/27/22 1522)  Resp: 16 (10/27/22 1522)  BP: (!) 156/89 (10/27/22 1522)  SpO2: (!) 94 % (10/27/22 1522)   Vital Signs (24h Range):  Temp:  [97.1 °F (36.2 °C)-99.3 °F (37.4 °C)] 97.1 °F (36.2 °C)  Pulse:  [82-83] 82  Resp:  [16] 16  SpO2:  [94 %-97 %] 94 %  BP: (144-156)/() 156/89     Weight: 111.1 kg (245 lb)  Body mass index is 35.15 kg/m².    Physical Exam     Vitals reviewed  General: NAD, Well developed, Well Nourished  Head: NC/AT  Eyes: EOMI, CRISTIANO  Cardiovascular: Pulses intact distally, Regular Rate and rhythm  Pulmonary: Normal Respiratory Rate, No respiratory distress  Gi: Soft, Non-tender  Extremities: Warm, No edema present  Skin: Multiple pustular lesions on right hand and middle finger. Swelling of middle finger. Associated numbness and decreased flexion. Also with pustular lesion on left palm. Patient also with redness and flaking skin to bilateral dorsum of feet. Patient also with redness surrounding a scab on right lateral calf. Patient also with redness and flaking on back of thigh.  Neuro: Alert, Oriented x3, No focal Deficit  Psych: Appropriate mood and affect       Significant Labs: All pertinent labs within the past 24 hours have been reviewed.    Significant Imaging: I have reviewed all pertinent imaging results/findings within the past 24  hours.    Assessment/Plan:     * Pustular dermatitis  IV abx  Xray with no gas  CT hand w/ contrast ordered  Has worsened since admit- If no improvement or further worsening will need ortho consult and derm consult  NPO for now until scan completed           VTE Risk Mitigation (From admission, onward)         Ordered     IP VTE LOW RISK PATIENT  Once         10/27/22 1425     Place sequential compression device  Until discontinued         10/27/22 1425                   Dayron See MD  Department of Hospital Medicine   [place] - Intensive Care

## 2022-10-27 NOTE — SUBJECTIVE & OBJECTIVE
Past Medical History:   Diagnosis Date    Hypertension        Past Surgical History:   Procedure Laterality Date    KNEE SURGERY      WRIST FRACTURE SURGERY         Review of patient's allergies indicates:   Allergen Reactions    Poison ivy extract Rash       No current facility-administered medications on file prior to encounter.     Current Outpatient Medications on File Prior to Encounter   Medication Sig    [DISCONTINUED] amLODIPine (NORVASC) 5 MG tablet Take 1 tablet by mouth once daily.     Family History       Problem Relation (Age of Onset)    Cancer Father    Hypertension Mother          Tobacco Use    Smoking status: Never    Smokeless tobacco: Former   Substance and Sexual Activity    Alcohol use: Not Currently    Drug use: Never    Sexual activity: Yes     Review of Systems  Objective:     Vital Signs (Most Recent):  Temp: 97.1 °F (36.2 °C) (10/27/22 1522)  Pulse: 82 (10/27/22 1522)  Resp: 16 (10/27/22 1522)  BP: (!) 156/89 (10/27/22 1522)  SpO2: (!) 94 % (10/27/22 1522)   Vital Signs (24h Range):  Temp:  [97.1 °F (36.2 °C)-99.3 °F (37.4 °C)] 97.1 °F (36.2 °C)  Pulse:  [82-83] 82  Resp:  [16] 16  SpO2:  [94 %-97 %] 94 %  BP: (144-156)/() 156/89     Weight: 111.1 kg (245 lb)  Body mass index is 35.15 kg/m².    Physical Exam     Vitals reviewed  General: NAD, Well developed, Well Nourished  Head: NC/AT  Eyes: EOMI, CRISTIANO  Cardiovascular: Pulses intact distally, Regular Rate and rhythm  Pulmonary: Normal Respiratory Rate, No respiratory distress  Gi: Soft, Non-tender  Extremities: Warm, No edema present  Skin: Multiple pustular lesions on right hand and middle finger. Swelling of middle finger. Associated numbness and decreased flexion. Also with pustular lesion on left palm. Patient also with redness and flaking skin to bilateral dorsum of feet. Patient also with redness surrounding a scab on right lateral calf. Patient also with redness and flaking on back of thigh.  Neuro: Alert, Oriented x3, No  focal Deficit  Psych: Appropriate mood and affect       Significant Labs: All pertinent labs within the past 24 hours have been reviewed.    Significant Imaging: I have reviewed all pertinent imaging results/findings within the past 24 hours.

## 2022-10-28 VITALS
SYSTOLIC BLOOD PRESSURE: 139 MMHG | BODY MASS INDEX: 35.07 KG/M2 | RESPIRATION RATE: 18 BRPM | OXYGEN SATURATION: 96 % | DIASTOLIC BLOOD PRESSURE: 77 MMHG | TEMPERATURE: 98 F | HEART RATE: 67 BPM | HEIGHT: 70 IN | WEIGHT: 245 LBS

## 2022-10-28 LAB
ALBUMIN SERPL BCP-MCNC: 3.4 G/DL (ref 3.5–5.2)
ALP SERPL-CCNC: 62 U/L (ref 55–135)
ALT SERPL W/O P-5'-P-CCNC: 19 U/L (ref 10–44)
ANION GAP SERPL CALC-SCNC: 10 MMOL/L (ref 8–16)
AST SERPL-CCNC: 15 U/L (ref 10–40)
BASOPHILS # BLD AUTO: 0 K/UL (ref 0–0.2)
BASOPHILS NFR BLD: 0 % (ref 0–1.9)
BILIRUB SERPL-MCNC: 0.6 MG/DL (ref 0.1–1)
BUN SERPL-MCNC: 10 MG/DL (ref 6–20)
CALCIUM SERPL-MCNC: 8.4 MG/DL (ref 8.7–10.5)
CHLORIDE SERPL-SCNC: 106 MMOL/L (ref 95–110)
CO2 SERPL-SCNC: 24 MMOL/L (ref 23–29)
CREAT SERPL-MCNC: 1 MG/DL (ref 0.5–1.4)
DIFFERENTIAL METHOD: NORMAL
EOSINOPHIL # BLD AUTO: 0 K/UL (ref 0–0.5)
EOSINOPHIL NFR BLD: 0.3 % (ref 0–8)
ERYTHROCYTE [DISTWIDTH] IN BLOOD BY AUTOMATED COUNT: 12 % (ref 11.5–14.5)
EST. GFR  (NO RACE VARIABLE): >60 ML/MIN/1.73 M^2
GLUCOSE SERPL-MCNC: 101 MG/DL (ref 70–110)
HCT VFR BLD AUTO: 41.5 % (ref 40–54)
HGB BLD-MCNC: 14 G/DL (ref 14–18)
IMM GRANULOCYTES # BLD AUTO: 0.02 K/UL (ref 0–0.04)
IMM GRANULOCYTES NFR BLD AUTO: 0.3 % (ref 0–0.5)
LYMPHOCYTES # BLD AUTO: 1.6 K/UL (ref 1–4.8)
LYMPHOCYTES NFR BLD: 24.2 % (ref 18–48)
MAGNESIUM SERPL-MCNC: 2 MG/DL (ref 1.6–2.6)
MCH RBC QN AUTO: 29 PG (ref 27–31)
MCHC RBC AUTO-ENTMCNC: 33.7 G/DL (ref 32–36)
MCV RBC AUTO: 86 FL (ref 82–98)
MONOCYTES # BLD AUTO: 0.5 K/UL (ref 0.3–1)
MONOCYTES NFR BLD: 8.1 % (ref 4–15)
NEUTROPHILS # BLD AUTO: 4.5 K/UL (ref 1.8–7.7)
NEUTROPHILS NFR BLD: 67.1 % (ref 38–73)
NRBC BLD-RTO: 0 /100 WBC
PLATELET # BLD AUTO: 223 K/UL (ref 150–450)
PMV BLD AUTO: 9.8 FL (ref 9.2–12.9)
POTASSIUM SERPL-SCNC: 4.1 MMOL/L (ref 3.5–5.1)
PROT SERPL-MCNC: 7.1 G/DL (ref 6–8.4)
RBC # BLD AUTO: 4.83 M/UL (ref 4.6–6.2)
RPR SER QL: NORMAL
SODIUM SERPL-SCNC: 140 MMOL/L (ref 136–145)
WBC # BLD AUTO: 6.64 K/UL (ref 3.9–12.7)

## 2022-10-28 PROCEDURE — 83735 ASSAY OF MAGNESIUM: CPT | Performed by: STUDENT IN AN ORGANIZED HEALTH CARE EDUCATION/TRAINING PROGRAM

## 2022-10-28 PROCEDURE — 85025 COMPLETE CBC W/AUTO DIFF WBC: CPT | Performed by: STUDENT IN AN ORGANIZED HEALTH CARE EDUCATION/TRAINING PROGRAM

## 2022-10-28 PROCEDURE — 80053 COMPREHEN METABOLIC PANEL: CPT | Performed by: STUDENT IN AN ORGANIZED HEALTH CARE EDUCATION/TRAINING PROGRAM

## 2022-10-28 PROCEDURE — 99217 PR OBSERVATION CARE DISCHARGE: CPT | Mod: ,,, | Performed by: STUDENT IN AN ORGANIZED HEALTH CARE EDUCATION/TRAINING PROGRAM

## 2022-10-28 PROCEDURE — G0378 HOSPITAL OBSERVATION PER HR: HCPCS

## 2022-10-28 PROCEDURE — 99217 PR OBSERVATION CARE DISCHARGE: ICD-10-PCS | Mod: ,,, | Performed by: STUDENT IN AN ORGANIZED HEALTH CARE EDUCATION/TRAINING PROGRAM

## 2022-10-28 PROCEDURE — 25000003 PHARM REV CODE 250: Performed by: STUDENT IN AN ORGANIZED HEALTH CARE EDUCATION/TRAINING PROGRAM

## 2022-10-28 PROCEDURE — 96366 THER/PROPH/DIAG IV INF ADDON: CPT

## 2022-10-28 PROCEDURE — 63600175 PHARM REV CODE 636 W HCPCS: Performed by: STUDENT IN AN ORGANIZED HEALTH CARE EDUCATION/TRAINING PROGRAM

## 2022-10-28 RX ORDER — CLINDAMYCIN HYDROCHLORIDE 150 MG/1
450 CAPSULE ORAL EVERY 8 HOURS
Qty: 63 CAPSULE | Refills: 0 | Status: SHIPPED | OUTPATIENT
Start: 2022-10-28 | End: 2022-11-04

## 2022-10-28 RX ADMIN — PIPERACILLIN AND TAZOBACTAM 4.5 G: 4; .5 INJECTION, POWDER, LYOPHILIZED, FOR SOLUTION INTRAVENOUS; PARENTERAL at 01:10

## 2022-10-28 RX ADMIN — PIPERACILLIN AND TAZOBACTAM 4.5 G: 4; .5 INJECTION, POWDER, LYOPHILIZED, FOR SOLUTION INTRAVENOUS; PARENTERAL at 05:10

## 2022-10-28 RX ADMIN — VANCOMYCIN HYDROCHLORIDE 1500 MG: 1.5 INJECTION, POWDER, LYOPHILIZED, FOR SOLUTION INTRAVENOUS at 01:10

## 2022-10-28 RX ADMIN — VANCOMYCIN HYDROCHLORIDE 1500 MG: 1.5 INJECTION, POWDER, LYOPHILIZED, FOR SOLUTION INTRAVENOUS at 03:10

## 2022-10-28 NOTE — PLAN OF CARE
10/28/22 0942   Final Note   Assessment Type Final Discharge Note   Anticipated Discharge Disposition Home   What phone number can be called within the next 1-3 days to see how you are doing after discharge? [phone]   Hospital Resources/Appts/Education Provided Appointments scheduled and added to AVS   Post-Acute Status   Discharge Delays None known at this time   Verbal & written follow up appointment with Nia Wilks NP with [facility]. Also provided him with dermatology appointment with Dr Kath Collazo; appointment is in March. Both are ok with keeping the appointment but if Nia Wilks NP thinks he needs to be seen sooner they will get one. Dr See thinks he can wait until March since he is following up with PCP. His antibiotic will cost around $20 with [facility]. They don't need a voucher at this time. Plan to be discharged after 2pm antibiotic. Denies any other needs at this time.

## 2022-10-28 NOTE — NURSING
PATIENT RESTING IN BED WITH EYES OPEN. SPOUSE AT BEDSIDE. PATIENT DENIES PAIN. NO NEEDS. SIDE RAILS UP X 2. CALL LIGHT IN REACH.

## 2022-10-28 NOTE — PLAN OF CARE
10/28/22 0836   Discharge Assessment   Assessment Type Discharge Planning Assessment   Confirmed/corrected address, phone number and insurance Yes   Confirmed Demographics Correct on Facesheet   Source of Information patient   When was your last doctors appointment?   (couple of months ago)   Does patient/caregiver understand observation status Yes   Communicated BROWN with patient/caregiver Yes   Reason For Admission cellulits to fingertip   Lives With child(selin), adult;spouse   Do you expect to return to your current living situation? Yes   Do you have help at home or someone to help you manage your care at home? Yes   Who are your caregiver(s) and their phone number(s)? Imelda Nelson spouse [phone]   Prior to hospitilization cognitive status: Alert/Oriented   Current cognitive status: Alert/Oriented   Walking or Climbing Stairs Difficulty none   Dressing/Bathing Difficulty none   Home Accessibility stairs within home   Number of Stairs, Within Home, Primary other (see comments)  (22)   Home Layout Able to live on 1st floor   Equipment Currently Used at Home none   Readmission within 30 days? No   Patient currently being followed by outpatient case management? No   Do you currently have service(s) that help you manage your care at home? No   Do you take prescription medications? No   Do you have prescription coverage? No   Do you have any problems affording any of your prescribed medications? TBD   Is the patient taking medications as prescribed?   (NA)   Who is going to help you get home at discharge? Imelda Nelson spouse [phone]   How do you get to doctors appointments? family or friend will provide   Are you on dialysis? No   Do you take coumadin? No   Discharge Plan A Home with family   Discharge Plan B Home with family   DME Needed Upon Discharge  none   Discharge Plan discussed with: Spouse/sig other;Patient   Name(s) and Number(s) Imelda Nelson spouse [phone]   Discharge Barriers  Identified Unisured   Physical Activity   On average, how many days per week do you engage in moderate to strenuous exercise (like a brisk walk)? 7 days   On average, how many minutes do you engage in exercise at this level? 150+ min   Financial Resource Strain   How hard is it for you to pay for the very basics like food, housing, medical care, and heating? Somewhat   Housing Stability   In the last 12 months, was there a time when you were not able to pay the mortgage or rent on time? N   In the last 12 months, how many places have you lived? 1   In the last 12 months, was there a time when you did not have a steady place to sleep or slept in a shelter (including now)? N   Transportation Needs   In the past 12 months, has lack of transportation kept you from medical appointments or from getting medications? no   In the past 12 months, has lack of transportation kept you from meetings, work, or from getting things needed for daily living? No   Food Insecurity   Within the past 12 months, you worried that your food would run out before you got the money to buy more. Never true   Within the past 12 months, the food you bought just didn't last and you didn't have money to get more. Never true   Stress   Do you feel stress - tense, restless, nervous, or anxious, or unable to sleep at night because your mind is troubled all the time - these days? Only a littl   Social Connections   In a typical week, how many times do you talk on the phone with family, friends, or neighbors? Never   How often do you get together with friends or relatives? More than 3   How often do you attend Congregation or Voodoo services? Never   Do you belong to any clubs or organizations such as Congregation groups, unions, fraternal or athletic groups, or school groups? No   Are you , , , , never , or living with a partner?    Alcohol Use   Q1: How often do you have a drink containing alcohol? Monthly or l   Q2:  How many drinks containing alcohol do you have on a typical day when you are drinking? 3 or 4   Q3: How often do you have six or more drinks on one occasion? Never   Relationship/Environment   Name(s) of Who Lives With Patient Imelda Nelson spouse [phone]   Patient alert & oriented lives at home with his spouse Imelda who is at bedside & their 2 daughters ages 32 & 28 & 2 grandchildren ages 11 & 12. He is independent at home. He denies using any medical equipment at home. He does not have insurance at this time but has been approved for discount on his hospital bill. He uses [facility] for PCP. He currently doesn't take any medications at home. He is unsure if he will need a voucher at discharge. Will find out which antibiotic he will need at discharge & get price with [facility] for patient. Denies any other needs at this time.

## 2022-10-28 NOTE — NURSING
DC INSTRUCTIONS DISCUSSED WITH PATIENT AND A COPY PROVIDED TO PATIENT. IV'S D'CD, CATHETER TIP INTACT. PRESSURE DRESSING APPLIED. TOLERATED WELL. PRESCRIPTIONS SENT TO DESIGNATED PHARMACY. ALL BELONGINGS WITH PATIENT. AMBULATORY TO PRIVATE VEHICLE AT THIS TIME WITH SPOUSE.

## 2022-10-28 NOTE — NURSING
PATIENT RESTING LYING IN BED AT THIS TIME. NO DISTRESS NOTED. CALL LIGHT IN REACH. SIDE RAILS UP X 2. SPOUSE AT BEDSIDE. NO NEEDS. VS STABLE.

## 2022-10-28 NOTE — NURSING
IV ANTIBIOTICS INFUSING AT THIS TIME. NO DISTRESS NOTED. PATIENT LYING IN BED RESTING WITH EYES CLOSED. SPOUSE AT BEDSIDE. SIDE RAILS UP X2

## 2022-10-29 NOTE — HOSPITAL COURSE
51 yo w/ no PMH admitted for pustular dermatitis and cellulitis of left hand extending up left middle finger. Imaging did not reveal any abscess or gas. Improved with IV abx. Cultures w/ Staph and Strep. Patient with marked improvement day after admit. Switched to PO clindamycin to complete course. Provided return precautions and discharge instructions.

## 2022-10-29 NOTE — DISCHARGE SUMMARY
[facility] Medicine  Discharge Summary      Patient Name: Ky Nelson Jr.  MRN: [id number]  Patient Class: OP- Observation  Admission Date: 10/27/2022  Hospital Length of Stay: 0 days  Discharge Date and Time: 10/28/2022  3:20 PM  Attending Physician: No att. providers found   Discharging Provider: Dayron See MD  Primary Care Provider: Nia Wilks NP      HPI:   52 w/ no pmh presenting w/ rash on right hand. Patient states that he had a small pustule on right middle finger that he lanced at home and then it spread down his finger to his hand with multiple pustular lesions. 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 Patient states that he did have his hands down in ocean water on Saturday while working on a boat. Noticed lesions on Tuesday. Also noticed a lesion on left hand today. Right lateral calf  with a lesion as well that came up two days ago and is healing. Patient also with lesion on hip that is red scaly and had some blisters form that has improved but started 2-3 weeks ago. Patient started noticing swelling of right middle finger and numbness today so came in to ED. No fever, chills, NVD. No sick contacts. Patient in monogamous sexual relationship with his wife for 33 years. Endorses mild cough that has been present for past 2-3 weeks but has had no sputum production or SOB.        Has a history of eczema that is flaring up and has lesions on bilateral feet that are not atypical for his typical flare ups.             * No surgery found *      Hospital Course:   53 yo w/ no PMH admitted for pustular dermatitis and cellulitis of left hand extending up left middle finger. Imaging did not reveal any abscess or gas. Improved with IV abx. Cultures w/ Staph and Strep. Patient with marked improvement day after admit. Switched to PO clindamycin to complete course. Provided return precautions and discharge instructions.        Goals of Care Treatment  Preferences:  Code Status: Full Code      Consults:     No new Assessment & Plan notes have been filed under this hospital service since the last note was generated.  Service: Hospital Medicine    Final Active Diagnoses:    Diagnosis Date Noted POA    PRINCIPAL PROBLEM:  Pustular dermatitis [L30.8] 10/27/2022 Yes      Problems Resolved During this Admission:       Discharged Condition: good    Disposition: Home or Self Care    Follow Up:   Follow-up Information     Nia Wilks NP. Go on 11/2/2022.    Why: appointment Wednesday Nov 2nd at 2:00pm for hospital follow up; bring $40 copay; discharge paperwork  Contact information:  [address]  [place] [address]  [phone]           Kath Collazo MD. Go on 3/7/2023.    Specialty: Dermatology  Why: appointment Tuesday March 7th at 7:30am for dermatology follow up  Contact information:  [address] [place] [address] [address]  [facility] [address]  [phone]                       Patient Instructions:      Diet Adult Regular     Notify your health care provider if you experience any of the following:  temperature >100.4     Notify your health care provider if you experience any of the following:  redness, tenderness, or signs of infection (pain, swelling, redness, odor or green/yellow discharge around incision site)     Activity as tolerated       Significant Diagnostic Studies: Labs:   CMP   Recent Labs   Lab 10/27/22  1301 10/28/22  0424    140   K 4.1 4.1    106   CO2 25 24   GLU 86 101   BUN 12 10   CREATININE 1.0 1.0   CALCIUM 9.0 8.4*   PROT 7.9 7.1   ALBUMIN 3.8 3.4*   BILITOT 0.5 0.6   ALKPHOS 74 62   AST 16 15   ALT 21 19   ANIONGAP 9 10    and CBC   Recent Labs   Lab 10/27/22  1358 10/28/22  0424   WBC 10.02 6.64   HGB 15.4 14.0   HCT 45.7 41.5    223       Pending Diagnostic Studies:     None         Medications:  Reconciled Home Medications:      Medication List      START taking these  medications    clindamycin 150 MG capsule  Commonly known as: CLEOCIN  Take 3 capsules (450 mg total) by mouth every 8 (eight) hours. for 7 days            Indwelling Lines/Drains at time of discharge:   Lines/Drains/Airways     None               Vitals reviewed  General: NAD, Well developed, Well Nourished  Head: NC/AT  Eyes: EOMI, CRISTIANO  Cardiovascular: Pulses intact distally, Regular Rate and rhythm  Pulmonary: Normal Respiratory Rate, No respiratory distress  Gi: Soft, Non-tender  Extremities: Warm, No edema present  Skin: Multiple pustular lesions on right hand and middle finger. Swelling of middle finger. Associated numbness and decreased flexion. Patient also with redness and flaking skin to bilateral dorsum of feet. Patient also with redness surrounding a scab on right lateral calf. Patient also with redness and flaking on back of thigh. Redness on hand and swelling improved. Pustular lesion on left palm resolved.  Neuro: Alert, Oriented x3, No focal Deficit  Psych: Appropriate mood and affect      Time spent on the discharge of patient: 45 minutes         Dayron See MD  Department of [facility] Medicine  [place] - Intensive Care

## 2022-10-31 LAB
BACTERIA SPEC AEROBE CULT: ABNORMAL
BACTERIA SPEC AEROBE CULT: ABNORMAL

## 2022-11-01 LAB
BACTERIA BLD CULT: NORMAL
BACTERIA BLD CULT: NORMAL